# Patient Record
Sex: MALE | URBAN - NONMETROPOLITAN AREA
[De-identification: names, ages, dates, MRNs, and addresses within clinical notes are randomized per-mention and may not be internally consistent; named-entity substitution may affect disease eponyms.]

---

## 2021-05-04 ENCOUNTER — APPOINTMENT (RX ONLY)
Dept: URBAN - NONMETROPOLITAN AREA CLINIC 2 | Facility: CLINIC | Age: 66
Setting detail: DERMATOLOGY
End: 2021-05-04

## 2021-05-04 DIAGNOSIS — D485 NEOPLASM OF UNCERTAIN BEHAVIOR OF SKIN: ICD-10-CM

## 2021-05-04 DIAGNOSIS — S0182XA OPEN WOUND OF OTHER AND MULTIPLE SITES OF FACE, COMPLICATED: ICD-10-CM

## 2021-05-04 DIAGNOSIS — I87.2 VENOUS INSUFFICIENCY (CHRONIC) (PERIPHERAL): ICD-10-CM | Status: INADEQUATELY CONTROLLED

## 2021-05-04 PROBLEM — D48.5 NEOPLASM OF UNCERTAIN BEHAVIOR OF SKIN: Status: ACTIVE | Noted: 2021-05-04

## 2021-05-04 PROBLEM — S71.102A UNSPECIFIED OPEN WOUND, LEFT THIGH, INITIAL ENCOUNTER: Status: ACTIVE | Noted: 2021-05-04

## 2021-05-04 PROCEDURE — ? COUNSELING

## 2021-05-04 PROCEDURE — 99204 OFFICE O/P NEW MOD 45 MIN: CPT | Mod: 25

## 2021-05-04 PROCEDURE — ? PRESCRIPTION MEDICATION MANAGEMENT

## 2021-05-04 PROCEDURE — ? PRESCRIPTION

## 2021-05-04 PROCEDURE — ? ORDER TESTS

## 2021-05-04 PROCEDURE — ? BIOPSY BY SHAVE METHOD

## 2021-05-04 PROCEDURE — 11102 TANGNTL BX SKIN SINGLE LES: CPT

## 2021-05-04 RX ORDER — MUPIROCIN 20 MG/G
1 OINTMENT TOPICAL BID
Qty: 1 | Refills: 5 | Status: ERX | COMMUNITY
Start: 2021-05-04

## 2021-05-04 RX ADMIN — MUPIROCIN 1: 20 OINTMENT TOPICAL at 00:00

## 2021-05-04 ASSESSMENT — LOCATION SIMPLE DESCRIPTION DERM
LOCATION SIMPLE: RIGHT CALF
LOCATION SIMPLE: LEFT POSTERIOR THIGH
LOCATION SIMPLE: LEFT PRETIBIAL REGION
LOCATION SIMPLE: LEFT CALF
LOCATION SIMPLE: RIGHT PRETIBIAL REGION

## 2021-05-04 ASSESSMENT — LOCATION DETAILED DESCRIPTION DERM
LOCATION DETAILED: LEFT PROXIMAL POSTERIOR THIGH
LOCATION DETAILED: RIGHT DISTAL PRETIBIAL REGION
LOCATION DETAILED: LEFT DISTAL PRETIBIAL REGION
LOCATION DETAILED: LEFT DISTAL CALF
LOCATION DETAILED: RIGHT DISTAL CALF

## 2021-05-04 ASSESSMENT — LOCATION ZONE DERM: LOCATION ZONE: LEG

## 2021-05-04 NOTE — PROCEDURE: MIPS QUALITY
Quality 226: Preventive Care And Screening: Tobacco Use: Screening And Cessation Intervention: Patient screened for tobacco use, is a smoker AND received Cessation Counseling
Quality 111:Pneumonia Vaccination Status For Older Adults: Pneumococcal Vaccination not Administered or Previously Received, Reason not Otherwise Specified
Quality 130: Documentation Of Current Medications In The Medical Record: Current Medications Documented
Detail Level: Detailed
Quality 431: Preventive Care And Screening: Unhealthy Alcohol Use - Screening: Patient screened for unhealthy alcohol use using a single question and scores less than 2 times per year

## 2021-05-04 NOTE — PROCEDURE: PRESCRIPTION MEDICATION MANAGEMENT
Plan: I called [facility] ([phone]) to give a verbal order for the following:\\nDiscontinue all application of Clotrimazole-Betamethasone Cream to affected area,\\nClean the area with warm soap and water,\\nApply Mupirocin 2% Ointment QD-BID or (3x weekly with Home Health Worker) until further notice,\\nCover area with Telfa dressing (given to patient during exam) or continue with Duoderm from previous orders
Render In Strict Bullet Format?: No
Detail Level: Zone
Initiate Treatment: Mupirocin 2% Ointment QD-BID (3x weekly with Home Health) until further notice
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
## 2021-05-07 ENCOUNTER — RX ONLY (OUTPATIENT)
Age: 66
Setting detail: RX ONLY
End: 2021-05-07

## 2021-05-07 RX ORDER — DOXYCYCLINE 100 MG/1
1 TABLET, FILM COATED ORAL BID
Qty: 20 | Refills: 0 | Status: ERX | COMMUNITY
Start: 2021-05-07

## 2021-08-23 ENCOUNTER — APPOINTMENT (RX ONLY)
Dept: URBAN - NONMETROPOLITAN AREA CLINIC 2 | Facility: CLINIC | Age: 66
Setting detail: DERMATOLOGY
End: 2021-08-23

## 2021-08-23 DIAGNOSIS — L98429 CHRONIC ULCER OF OTHER SPECIFIED SITES: ICD-10-CM | Status: WORSENING

## 2021-08-23 DIAGNOSIS — L98419 CHRONIC ULCER OF OTHER SPECIFIED SITES: ICD-10-CM | Status: WORSENING

## 2021-08-23 PROBLEM — L97.129 NON-PRESSURE CHRONIC ULCER OF LEFT THIGH WITH UNSPECIFIED SEVERITY: Status: ACTIVE | Noted: 2021-08-23

## 2021-08-23 PROCEDURE — ? ORDER TESTS

## 2021-08-23 PROCEDURE — ? COUNSELING

## 2021-08-23 PROCEDURE — ? PRESCRIPTION MEDICATION MANAGEMENT

## 2021-08-23 PROCEDURE — 99214 OFFICE O/P EST MOD 30 MIN: CPT

## 2021-08-23 PROCEDURE — ? PRESCRIPTION

## 2021-08-23 RX ORDER — MUPIROCIN 20 MG/G
1 OINTMENT TOPICAL BID
Qty: 22 | Refills: 5 | Status: ERX | COMMUNITY
Start: 2021-08-23

## 2021-08-23 RX ORDER — DOXYCYCLINE 100 MG/1
1 TABLET, FILM COATED ORAL BID
Qty: 28 | Refills: 0 | Status: ERX | COMMUNITY
Start: 2021-08-23

## 2021-08-23 RX ADMIN — DOXYCYCLINE 1: 100 TABLET, FILM COATED ORAL at 00:00

## 2021-08-23 RX ADMIN — MUPIROCIN 1: 20 OINTMENT TOPICAL at 00:00

## 2021-08-23 ASSESSMENT — LOCATION ZONE DERM: LOCATION ZONE: LEG

## 2021-08-23 ASSESSMENT — LOCATION DETAILED DESCRIPTION DERM: LOCATION DETAILED: LEFT PROXIMAL POSTERIOR THIGH

## 2021-08-23 ASSESSMENT — LOCATION SIMPLE DESCRIPTION DERM: LOCATION SIMPLE: LEFT POSTERIOR THIGH

## 2021-08-23 NOTE — PROCEDURE: PRESCRIPTION MEDICATION MANAGEMENT
Initiate Treatment: Doxycycline 100mg BID x 14 days\\nMupirocin 2% Ointment BID x 14 days or until healed
Plan: Wound care orders given to patient for Home Health nurse
Render In Strict Bullet Format?: No
Detail Level: Zone

## 2022-02-24 ENCOUNTER — APPOINTMENT (RX ONLY)
Dept: URBAN - NONMETROPOLITAN AREA CLINIC 2 | Facility: CLINIC | Age: 67
Setting detail: DERMATOLOGY
End: 2022-02-24

## 2022-02-24 DIAGNOSIS — L98419 CHRONIC ULCER OF OTHER SPECIFIED SITES: ICD-10-CM | Status: INADEQUATELY CONTROLLED

## 2022-02-24 DIAGNOSIS — L90.5 SCAR CONDITIONS AND FIBROSIS OF SKIN: ICD-10-CM

## 2022-02-24 DIAGNOSIS — L98429 CHRONIC ULCER OF OTHER SPECIFIED SITES: ICD-10-CM | Status: INADEQUATELY CONTROLLED

## 2022-02-24 PROBLEM — L97.129 NON-PRESSURE CHRONIC ULCER OF LEFT THIGH WITH UNSPECIFIED SEVERITY: Status: ACTIVE | Noted: 2022-02-24

## 2022-02-24 PROCEDURE — ? BIOPSY BY SHAVE METHOD

## 2022-02-24 PROCEDURE — ? COUNSELING

## 2022-02-24 PROCEDURE — 11102 TANGNTL BX SKIN SINGLE LES: CPT

## 2022-02-24 PROCEDURE — ? ORDER TESTS

## 2022-02-24 PROCEDURE — 99212 OFFICE O/P EST SF 10 MIN: CPT | Mod: 25

## 2022-02-24 ASSESSMENT — LOCATION SIMPLE DESCRIPTION DERM: LOCATION SIMPLE: LEFT POSTERIOR THIGH

## 2022-02-24 ASSESSMENT — LOCATION DETAILED DESCRIPTION DERM: LOCATION DETAILED: LEFT PROXIMAL MEDIAL POSTERIOR THIGH

## 2022-02-24 ASSESSMENT — LOCATION ZONE DERM: LOCATION ZONE: LEG

## 2022-02-24 NOTE — PROCEDURE: BIOPSY BY SHAVE METHOD
Detail Level: Detailed
Depth Of Biopsy: dermis
Was A Bandage Applied: Yes
Size Of Lesion In Cm: 0
Biopsy Type: H and E
Biopsy Method: Dermablade
Anesthesia Type: 1% lidocaine with epinephrine and a 1:10 solution of 8.4% sodium bicarbonate
Anesthesia Volume In Cc: 0.3
Additional Anesthesia Volume In Cc (Will Not Render If 0): 6
Hemostasis: Aluminum Chloride
Wound Care: Mupirocin
Dressing: bandage
Destruction After The Procedure: No
Type Of Destruction Used: Curettage
Curettage Text: The wound bed was treated with curettage after the biopsy was performed.
Cryotherapy Text: The wound bed was treated with cryotherapy after the biopsy was performed.
Electrodesiccation Text: The wound bed was treated with electrodesiccation after the biopsy was performed.
Electrodesiccation And Curettage Text: The wound bed was treated with electrodesiccation and curettage after the biopsy was performed.
Silver Nitrate Text: The wound bed was treated with silver nitrate after the biopsy was performed.
Lab: [id number]
Lab Facility: [id number]
Consent: Written consent was obtained and risks were reviewed including but not limited to scarring, infection, bleeding, scabbing, incomplete removal, nerve damage and allergy to anesthesia.
Post-Care Instructions: I reviewed with the patient in detail post-care instructions. Patient is to keep the biopsy site dry overnight, and then apply bacitracin twice daily until healed. Patient may apply hydrogen peroxide soaks to remove any crusting.
Notification Instructions: Patient will be notified of biopsy results. However, patient instructed to call the office if not contacted within 2 weeks. Results will be faxed to PCP when they are available.
Billing Type: Third-Party Bill
Information: Selecting Yes will display possible errors in your note based on the variables you have selected. This validation is only offered as a suggestion for you. PLEASE NOTE THAT THE VALIDATION TEXT WILL BE REMOVED WHEN YOU FINALIZE YOUR NOTE. IF YOU WANT TO FAX A PRELIMINARY NOTE YOU WILL NEED TO TOGGLE THIS TO 'NO' IF YOU DO NOT WANT IT IN YOUR FAXED NOTE.